# Patient Record
Sex: FEMALE
[De-identification: names, ages, dates, MRNs, and addresses within clinical notes are randomized per-mention and may not be internally consistent; named-entity substitution may affect disease eponyms.]

---

## 2017-03-04 NOTE — EDM.PDOC
ED HPI LOWER BACK PAIN/INJURY





- General


Chief Complaint: Back Pain or Injury


Stated Complaint: BACK PAIN


Time Seen by Provider: 17 11:47


Source of Information: Reports: Patient


History Limitations: Reports: No limitations





- History of Present Illness


INITIAL COMMENTS - FREE TEXT/NARRATIVE: 


HISTORY AND PHYSICAL:





History of present illness:


Patient comes to the emergency room complaining of upper back pain. She was 

removing her grandson from a car seat when she felt her upper back spasm and 

become immediately painful. She is brought to the ER by her . She has a 

history of C4-C5 fusion. She denies any low back pain. Taking a deep breath 

increases the pain to her mid back, so she prefers to breath shallowly at this 

time. Has recently been treated for a sinus infection by Dr. Peralta but 

otherwise was feeling well this morning.]





Review of systems: 


As per history of present illness and below otherwise all systems reviewed and 

negative.





Past medical history: 


As per history of present illness and as reviewed below otherwise 

noncontributory.





Surgical history: 


As per history of present illness and as reviewed below otherwise 

noncontributory.





Social history: 


No reported history of drug or alcohol abuse.





Family history: 


As per history of present illness and as reviewed below otherwise 

noncontributory.





Physical exam:


HEENT: Atraumatic, normocephalic. mucous membranes moist.


Lungs: Clear to auscultation, breath sounds equal bilaterally.


Heart: S1S2, regular rate and rhythm.


Abdomen: Soft, nondistended, nontender. 


Pelvis: Stable nontender.


Genitourinary: Deferred.


Rectal: Deferred.


Back: Muscle spasms appreciated to upper thoracic spine bilaterally.


Skin: Psoriatic lesions to upper and lower extremities.


Extremities: Atraumatic, negative for cords or calf pain. Swelling or cyanosis. 

Neurovascular unremarkable.


Neuro: Awake, alert, oriented.  Motor and sensory unremarkable throughout. Exam 

nonfocal.





Diagnostics:


[EKG]





Therapeutics:


[Norflex 60 mg IV, Toradol 30 mg IV, Tessalon Perles 200 mg x1]





Impression: 


[thoracic back pain]





Plan:


[Patient is coughing quite a bit while in the ER, she complains of postnasal 

drainage. Coughing causes spasms to her back. Improves with one dose of 

Tessalon Perles 200 mg. Pain improves to 3/10 after Norflex and Toradol. Pt 

will be discharged home with Rx for cyclobenzaprine 10mg (#20) si po TID 

prn spasm 0 RF's. Recommend alternating Tylenol with ibuprofen prn. F/U w/ PCP. 

She verbalized understanding.]





Definitive disposition and diagnosis as appropriate pending reevaluation and 

review of above.











- Related Data


Allergies/ADRs: 


 Allergies











Allergy/AdvReac Type Severity Reaction Status Date / Time


 


No Known Allergies Allergy   Verified 17 11:40











Home Meds: 


 Home Meds





Albuterol [Proventil HFA] 2 puff INH Q4H PRN 16 [History]


Sertraline [Zoloft] 100 mg PO BEDTIME 16 [History]


buPROPion HCl [Wellbutrin Xl] 300 mg PO BEDTIME 16 [History]


Cetirizine [ZyrTEC] 10 mg PO DAILY 17 [History]


Fluticasone Propionate [Flonase Allergy Relief] 9.9 ml NS DAILY 17 [

History]


Omeprazole Magnesium [Prilosec Otc] 20 mg PO DAILY 17 [History]











Past Medical History


HEENT History: Reports: None


Cardiovascular History: Reports: None


Respiratory History: Reports: Asthma


Gastrointestinal History: Reports: None


Genitourinary History: Reports: None


OB/GYN History: Reports: Pregnancy


Musculoskeletal History: Reports: None


Neurological History: Reports: None


Psychiatric History: Reports: None


Endocrine/Metabolic History: Reports: None


Hematologic History: Reports: None


Immunologic History: Reports: None


Oncologic (Cancer) History: Reports: None


Dermatologic History: Reports: None





- Infectious Disease History


Infectious Disease History: Reports: Chicken pox





- Past Surgical History


Female  Surgical History: Reports: Endometrial ablation, Tubal ligation


Musculoskeletal Surgical History: Reports: Other (see below)


Other Musculoskeletal Surgeries/Procedures:: c-spine fusion





Social & Family History





- Family History


Family Medical History: Noncontributory





- Tobacco Use


Smoking Status *Q: Current Every Day Smoker


Years of Tobacco use: 16


Packs/Tins Daily: 0.5





- Recreational Drug Use


Recreational Drug Use: No





ED ROS GENERAL





- Review of Systems


Review Of Systems: ROS reveals no pertinent complaints other than HPI.





ED EXAM,LOWER BACK PAIN/INJURY





- Physical Exam


Exam: See Below





EKG INTERPRETATION


EKG Date: 17


Time: 11:58


Rhythm: NSR


Comparison: NA - no prior EKG





Course





- Vital Signs


Last Recorded V/S: 


 Last Vital Signs











Temp  98.3 F   17 11:44


 


Pulse  78   17 11:44


 


Resp  18   17 11:44


 


BP  151/99 H  17 11:44


 


Pulse Ox  98   17 11:44














- Orders/Labs/Meds


Orders: 


 Active Orders 24 hr











 Category Date Time Status


 


 EKG Documentation Completion [RC] STAT Care  17 11:51 Active


 


 Orphenadrine [Norflex] Med  17 12:00 Active





 60 mg IV Q12H   








 Medication Orders





Orphenadrine Citrate (Norflex)  60 mg IV Q12H TIFFANIE


   Last Admin: 17 12:06  Dose: 60 mg








Meds: 


Medications











Generic Name Dose Route Start Last Admin





  Trade Name Freq  PRN Reason Stop Dose Admin


 


Orphenadrine Citrate  60 mg  17 12:00  17 12:06





  Norflex  IV   60 mg





  Q12H TIFFANIE   Administration














Discontinued Medications














Generic Name Dose Route Start Last Admin





  Trade Name Freq  PRN Reason Stop Dose Admin


 


Benzonatate  200 mg  17 12:46  17 12:50





  Tessalon Perles  PO  17 12:47  200 mg





  ONETIME ONE   Administration


 


Ketorolac Tromethamine  30 mg  17 11:52  17 12:04





  Toradol  IVPUSH  17 11:53  30 mg





  ONETIME ONE   Administration














Departure





- Departure


Time of Disposition: 13:00


Disposition: Home, Self-Care 01


Condition: good


Clinical Impression: 


Thoracic back pain


Qualifiers:


 Chronicity: acute Back pain laterality: bilateral Qualified Code(s): M54.6 - 

Pain in thoracic spine





Forms:  ED Department Discharge


Additional Instructions: 


The following information is given to patients seen in the emergency department 

who are being discharged to home. This information is to outline your options 

for follow-up care. We provide all patients seen in our emergency department 

with a follow-up referral.





The need for follow-up, as well as the timing and circumstances, are variable 

depending upon the specifics of your emergency department visit.





If you don't have a primary care physician on staff, we will provide you with a 

referral. We always advise you to contact your personal physician following an 

emergency department visit to inform them of the circumstance of the visit and 

for follow-up with them and/or the need for any referrals to a consulting 

specialist.





The emergency department will also refer you to a specialist when appropriate. 

This referral assures that you have the opportunity for follow-up care with a 

specialist. All of these measure are taken in an effort to provide you with 

optimal care, which includes your follow-up.





Under all circumstances we always encourage you to contact your private 

physician who remains a resource for coordinating your care. When calling for 

follow-up care, please make the office aware that this follow-up is from your 

recent emergency room visit. If for any reason you are refused follow-up, 

please contact the [facility]  emergency 

department at [phone] and asked to speak to the emergency department 

charge nurse.








[facility]


Primary Care


[address] [place] [id number]


Phone: [phone]


Fax: [phone]








Followup with your local primary care provider or the clinic stated above in 48-

72 hours.


Alternate Tylenol and ibuprofen for discomfort. You're given a prescription for 

cyclobenzaprine to take up to 3 times a day as needed for back spasm.


Use heating pad or ice pack, whichever feels more comfortable.


Return to ER as needed and as discussed.








- My Orders


Last 24 Hours: 


My Active Orders





17 11:51


EKG Documentation Completion [RC] STAT 





17 12:00


Orphenadrine [Norflex]   60 mg IV Q12H 














- Assessment/Plan


Last 24 Hours: 


My Active Orders





17 11:51


EKG Documentation Completion [RC] STAT 





17 12:00


Orphenadrine [Norflex]   60 mg IV Q12H

## 2020-07-13 ENCOUNTER — HOSPITAL ENCOUNTER (OUTPATIENT)
Dept: HOSPITAL 56 - MW.SDS | Age: 50
Discharge: HOME | End: 2020-07-13
Attending: SURGERY
Payer: COMMERCIAL

## 2020-07-13 VITALS — DIASTOLIC BLOOD PRESSURE: 57 MMHG | HEART RATE: 74 BPM | SYSTOLIC BLOOD PRESSURE: 105 MMHG

## 2020-07-13 DIAGNOSIS — F41.9: ICD-10-CM

## 2020-07-13 DIAGNOSIS — I10: ICD-10-CM

## 2020-07-13 DIAGNOSIS — F17.210: ICD-10-CM

## 2020-07-13 DIAGNOSIS — L40.9: ICD-10-CM

## 2020-07-13 DIAGNOSIS — Z91.048: ICD-10-CM

## 2020-07-13 DIAGNOSIS — J44.9: ICD-10-CM

## 2020-07-13 DIAGNOSIS — Z87.09: ICD-10-CM

## 2020-07-13 DIAGNOSIS — Z80.0: ICD-10-CM

## 2020-07-13 DIAGNOSIS — Z79.899: ICD-10-CM

## 2020-07-13 DIAGNOSIS — K21.9: ICD-10-CM

## 2020-07-13 DIAGNOSIS — F32.9: ICD-10-CM

## 2020-07-13 DIAGNOSIS — G47.30: ICD-10-CM

## 2020-07-13 DIAGNOSIS — Z79.51: ICD-10-CM

## 2020-07-13 DIAGNOSIS — Z98.1: ICD-10-CM

## 2020-07-13 DIAGNOSIS — Z12.11: Primary | ICD-10-CM

## 2020-07-13 PROCEDURE — 45378 DIAGNOSTIC COLONOSCOPY: CPT

## 2020-07-13 NOTE — PCM.PREANE
Preanesthetic Assessment





- Anesthesia/Transfusion/Family Hx


Anesthesia History: Prior Anesthesia Without Reaction


Family History of Anesthesia Reaction: No


Transfusion History: No Prior Transfusion(s)


Intubation History: Unknown





- Review of Systems


General: No Symptoms


Pulmonary: No Symptoms


Cardiovascular: No Symptoms


Gastrointestinal: No Symptoms


Neurological: No Symptoms


Other: Reports: None





- Physical Assessment


Height: 5 ft 6 in


Weight: 80.286 kg


ASA Class: 3


Mental Status: Alert & Oriented x3


Airway Class: Mallampati = 2


Dentition: Reports: Normal Dentition, Crown(s) (lower bilateral (multiple), 

Implants (x2 rt. upper), Broken Tooth/Teeth (x1 left lower (back))


Thyro-Mental Finger Breadths: 3


Mouth Opening Finger Breadths: 3


ROM/Head Extension: Limited/Partial


Lungs: Normal Respiratory Effort, Crackles


Cardiovascular: Regular Rate, Regular Rhythm





- Allergies


Allergies/Adverse Reactions: 


                                    Allergies











Allergy/AdvReac Type Severity Reaction Status Date / Time


 


cat dander Allergy  Hives Verified 07/08/20 09:54


 


dog dander Allergy  Hives Verified 07/08/20 09:54














- Blood


Blood Available: No





- Anesthesia Plan


Pre-Op Medication Ordered: None





- Acknowledgements


Anesthesia Type Planned: MAC


Pt an Appropriate Candidate for the Planned Anesthesia: Yes


Alternatives and Risks of Anesthesia Discussed w Pt/Guardian: Yes


Pt/Guardian Understands and Agrees with Anesthesia Plan: Yes





PreAnesthesia Questionnaire


HEENT History: Reports: Allergic Rhinitis, Hard of Hearing


Cardiovascular History: Reports: Hypertension


Respiratory History: Reports: Asthma, COPD, Sleep Apnea


Other Respiratory History: uses CPAP nightly,  rarely uses rescue inhaler 

(triggered by allergies)


Gastrointestinal History: Reports: GERD


Genitourinary History: Reports: None


OB/GYN History: Reports: Pregnancy


Musculoskeletal History: Reports: Fracture


Other Musculoskeletal History: hx of fx arm


Neurological History: Reports: None


Psychiatric History: Reports: Anxiety, Depression


Endocrine/Metabolic History: Reports: None


Hematologic History: Reports: None


Immunologic History: Reports: None


Oncologic (Cancer) History: Reports: None


Dermatologic History: Reports: Psoriasis





- Infectious Disease History


Infectious Disease History: Reports: Chicken Pox





- Past Surgical History


Head Surgeries/Procedures: Reports: None


HEENT Surgical History: Reports: Oral Surgery


Other HEENT Surgeries/Procedures: upper and lower dental implants


Female  Surgical History: Reports: Tubal Ligation


Neurological Surgical History: Reports: C-Spine, Spinal Fusion


Other Neurological Surgeries/Procedures: ACDF C5-5,  has hardware, doing better 

but needs redo surgery per patient





- SUBSTANCE USE


Smoking Status *Q: Current Every Day Smoker (< 1/2 ppd)


Tobacco Use Within Last Twelve Months: Cigarettes


Recreational Drug Use History: No





- HOME MEDS


Home Medications: 


                                    Home Meds





Albuterol [Proventil HFA] 2 puff INH Q4H PRN 03/21/16 [History]


buPROPion HCL [Wellbutrin Xl] 300 mg PO BEDTIME 03/21/16 [History]


Fluticasone Propionate [Flonase Allergy Relief] 2 spray NASBOTH DAILY PRN 

03/04/17 [History]


Omeprazole Magnesium [Prilosec Otc] 20 mg PO DAILY PRN 03/04/17 [History]


ALPRAZolam [Xanax XR] 0.5 mg PO ASDIRECTED PRN 07/08/20 [History]


Calcipotriene [Dovonex 0.005% Crm] 1 applic TOP ASDIRECTED PRN 07/08/20 

[History]


Cholecalciferol (Vitamin D3) [Vitamin D3] 2,000 unit PO DAILY 07/08/20 [History]


Fluticasone/Vilanterol [Breo Ellipta 100-25 MCG Inhalation Kit] 1 inh INH DAILY 

07/08/20 [History]


Montelukast Sodium 10 mg PO DAILY 07/08/20 [History]


Multivitamin 1 tab PO DAILY 07/08/20 [History]


Multivitamin with Minerals [Hair, Skin and Nails] 1 tab PO DAILY 07/08/20 

[History]


PARoxetine HCL [Paroxetine HCl] 40 mg PO DAILY 07/08/20 [History]


estradioL [Imvexxy] 1 tab VAG WEEKLY 07/08/20 [History]


lisinopriL [Lisinopril] 20 mg PO QAM 07/08/20 [History]











- CURRENT (IN HOUSE) MEDS


Current Meds: 





                               Current Medications





Lactated Ringer's (Ringers, Lactated)  1,000 mls @ 125 mls/hr IV ASDIRECTED TIFFANIE





Discontinued Medications





Fentanyl (Sublimaze) Confirm Administered Dose 100 mcg .ROUTE .STK-MED ONE


   Stop: 07/13/20 11:33


Lidocaine (Xylocaine-Mpf 2%) Confirm Administered Dose 5 ml .ROUTE .STK-MED ONE


   Stop: 07/13/20 11:33


Propofol (Diprivan  20 Ml) Confirm Administered Dose 400 mg .ROUTE .[facility]-MED ONE


   Stop: 07/13/20 11:33

## 2020-07-13 NOTE — [id number]
Post Anesthesia Note





- EVALUATION WITHIN 48HRS OF ANESTHETIC


Vital Signs in Normal Range: Yes


Patient Participated in Evaluation: Yes


Respiratory Function Stable: Yes


Airway Patent: Yes


Cardiovascular Function Stable: Yes


Hydration Status Stable: Yes


Pain Control Satisfactory: Yes


Nausea and Vomiting Control Satisfactory: Yes


Mental Status Recovered: Yes


Vital Signs: 


                                Last Vital Signs











Temp  36.0 C L  07/13/20 14:58


 


Pulse  84   07/13/20 15:15


 


Resp  22 H  07/13/20 15:15


 


BP  116/59 L  07/13/20 15:15


 


Pulse Ox  96   07/13/20 15:15














- COMMENTS/OBSERVATIONS


Free Text/Narrative:: 





No anesthesia problems

## 2020-07-13 NOTE — OR
SURGEON:

Mauricio Borges M.D.

 

DATE OF PROCEDURE:  07/13/2020

 

OPERATION PERFORMED:

Colonoscopy.

 

PRIMARY SURGEON:

Mauricio Borges MD

 

ANESTHESIA:

MAC.

 

ASA CLASSIFICATION:

III.

 

ASSISTANT:

ROSIE Finch student.

 

PREOPERATIVE DIAGNOSES:

1. Family history of colon cancer.

2. Desire for colorectal cancer screening.

 

POSTOPERATIVE DIAGNOSIS:

No evidence of neoplasia.

 

DESCRIPTION OF PROCEDURE:

The patient was taken to the endoscopy room, positioned on the endoscopy table

in the left lateral decubitus position.  Time-out was called for appropriate

identification of the patient and procedure.  Monitored anesthesia care was

provided.  The colonoscope was inserted into the rectum and advanced with

moderate difficulty to the cecum.  The prep was only fair as there was a

moderate amount of mucousy material present.  Once the colonoscope was

introduced to the cecum, it was retroflexed to visualize the anal orifice from

above.  Again, no tumors or polyps were seen in a retroflexed view.  The cecum,

ascending colon, hepatic flexure, transverse colon, splenic flexure, descending

colon, sigmoid colon, and rectum were visualized although there was a moderate

amount of mucousy brown stool present.  No obvious stricture was noted.  No

large polyps or diverticular changes were noted.  There was no suggestion of

inflammatory bowel disease.  Once the colonoscope was withdrawn to the rectum,

it was retroflexed to visualize the anal orifice from above.  Again, no tumors

or polyps were seen and there were no acute hemorrhoids.  The colonoscope was

then straightened, the rectum aspirated, and the colonoscope removed.

 

The patient tolerated the procedure well and was taken to recovery room in

satisfactory condition.

 

 

JESUS / GIBRAN

DD:  07/13/2020 15:00:20

DT:  07/13/2020 15:59:10

Job #:  [id number]

## 2020-07-13 NOTE — PCM.OPNOTE
- General Post-Op/Procedure Note


Date of Surgery/Procedure: 07/13/20


Operative Procedure(s): Colonoscopy


Pre Op Diagnosis: Family history of colon cancer.  Desire for colorectal cancer 

screening.


Post-Op Diagnosis: No evidence of neoplasia.


Anesthesia Technique: MAC (ASA III)


Primary Surgeon: Mauricio Borges


Assistant: Noelle Meier


Condition: Good


Free Text/Narrative:: 


DICTATION [id number]





CPT CODE 72633

## 2020-07-13 NOTE — PCM.POSTAN
POST ANESTHESIA ASSESSMENT





- MENTAL STATUS


Mental Status: Alert, Oriented





- VITAL SIGNS


Vital Signs: 


                                Last Vital Signs











Temp  36.0 C L  07/13/20 14:58


 


Pulse  84   07/13/20 15:15


 


Resp  22 H  07/13/20 15:15


 


BP  116/59 L  07/13/20 15:15


 


Pulse Ox  96   07/13/20 15:15














- RESPIRATORY


Respiratory Status: Respiratory Rate WNL, Airway Patent, O2 Saturation Stable





- CARDIOVASCULAR


CV Status: Pulse Rate WNL, Blood Pressure Stable





- GASTROINTESTINAL


GI Status: No Symptoms





- PAIN


Pain Score: 0





- POST OP HYDRATION


Hydration Status: Adequate & Stable





- OBSERVATIONS


Free Text/Narrative:: 





No anesthesia problems